# Patient Record
Sex: MALE | Race: WHITE | Employment: OTHER | URBAN - METROPOLITAN AREA
[De-identification: names, ages, dates, MRNs, and addresses within clinical notes are randomized per-mention and may not be internally consistent; named-entity substitution may affect disease eponyms.]

---

## 2021-04-13 DIAGNOSIS — Z23 ENCOUNTER FOR IMMUNIZATION: ICD-10-CM

## 2024-02-29 PROBLEM — R65.21 SEPTIC SHOCK (HCC): Status: ACTIVE | Noted: 2024-02-29

## 2024-02-29 PROBLEM — A41.9 SEPTIC SHOCK (HCC): Status: ACTIVE | Noted: 2024-02-29

## 2024-03-02 PROBLEM — D73.5 SPLENIC INFARCT: Status: ACTIVE | Noted: 2024-03-01

## 2024-03-02 PROBLEM — B95.61 STAPHYLOCOCCUS AUREUS BACTEREMIA: Status: ACTIVE | Noted: 2024-03-01

## 2024-03-02 PROBLEM — I35.0 SEVERE AORTIC STENOSIS: Status: ACTIVE | Noted: 2024-03-01

## 2024-03-02 PROBLEM — R78.81 STAPHYLOCOCCUS AUREUS BACTEREMIA: Status: ACTIVE | Noted: 2024-03-01

## 2024-03-05 ENCOUNTER — TELEPHONE (OUTPATIENT)
Age: 69
End: 2024-03-05

## 2024-03-05 NOTE — TELEPHONE ENCOUNTER
[facility] [employer] called left a voicemail asking if we had a Emilio Aguero as one of our patients. Left his date of birth and a call back number to return call. I tried to call the number left on the voicemail, no [email or web address]

## 2024-03-06 PROBLEM — R65.21 SEPTIC SHOCK (HCC): Status: RESOLVED | Noted: 2024-02-29 | Resolved: 2024-03-06

## 2024-03-06 PROBLEM — I38 ENDOCARDITIS: Status: ACTIVE | Noted: 2024-03-06

## 2024-03-06 PROBLEM — A41.9 SEPTIC SHOCK (HCC): Status: RESOLVED | Noted: 2024-02-29 | Resolved: 2024-03-06

## 2024-03-08 PROBLEM — I51.9: Status: ACTIVE | Noted: 2024-03-06
